# Patient Record
Sex: MALE | Race: WHITE | NOT HISPANIC OR LATINO | Employment: FULL TIME | URBAN - METROPOLITAN AREA
[De-identification: names, ages, dates, MRNs, and addresses within clinical notes are randomized per-mention and may not be internally consistent; named-entity substitution may affect disease eponyms.]

---

## 2020-11-03 ENCOUNTER — HISTORICAL (OUTPATIENT)
Dept: ADMINISTRATIVE | Facility: HOSPITAL | Age: 30
End: 2020-11-03

## 2022-04-10 ENCOUNTER — HISTORICAL (OUTPATIENT)
Dept: ADMINISTRATIVE | Facility: HOSPITAL | Age: 32
End: 2022-04-10

## 2022-04-28 VITALS
BODY MASS INDEX: 33.68 KG/M2 | OXYGEN SATURATION: 97 % | SYSTOLIC BLOOD PRESSURE: 122 MMHG | WEIGHT: 248.69 LBS | HEIGHT: 72 IN | DIASTOLIC BLOOD PRESSURE: 70 MMHG

## 2023-11-14 ENCOUNTER — OFFICE VISIT (OUTPATIENT)
Dept: URGENT CARE | Facility: CLINIC | Age: 33
End: 2023-11-14
Payer: COMMERCIAL

## 2023-11-14 VITALS
TEMPERATURE: 100 F | RESPIRATION RATE: 18 BRPM | OXYGEN SATURATION: 97 % | WEIGHT: 260 LBS | DIASTOLIC BLOOD PRESSURE: 70 MMHG | HEART RATE: 88 BPM | HEIGHT: 72 IN | SYSTOLIC BLOOD PRESSURE: 128 MMHG | BODY MASS INDEX: 35.21 KG/M2

## 2023-11-14 DIAGNOSIS — R05.9 COUGH, UNSPECIFIED TYPE: Primary | ICD-10-CM

## 2023-11-14 DIAGNOSIS — R68.89 FLU-LIKE SYMPTOMS: ICD-10-CM

## 2023-11-14 LAB
CTP QC/QA: YES
CTP QC/QA: YES
MOLECULAR STREP A: NEGATIVE
POC MOLECULAR INFLUENZA A AGN: NEGATIVE
POC MOLECULAR INFLUENZA B AGN: NEGATIVE

## 2023-11-14 PROCEDURE — 87502 POCT INFLUENZA A/B MOLECULAR: ICD-10-PCS | Mod: QW,,,

## 2023-11-14 PROCEDURE — 99213 PR OFFICE/OUTPT VISIT, EST, LEVL III, 20-29 MIN: ICD-10-PCS | Mod: ,,,

## 2023-11-14 PROCEDURE — 99213 OFFICE O/P EST LOW 20 MIN: CPT | Mod: ,,,

## 2023-11-14 PROCEDURE — 87651 POCT STREP A MOLECULAR: ICD-10-PCS | Mod: QW,,,

## 2023-11-14 PROCEDURE — 87651 STREP A DNA AMP PROBE: CPT | Mod: QW,,,

## 2023-11-14 PROCEDURE — 87502 INFLUENZA DNA AMP PROBE: CPT | Mod: QW,,,

## 2023-11-14 RX ORDER — BALOXAVIR MARBOXIL 80 MG/1
80 TABLET, FILM COATED ORAL ONCE
Qty: 1 TABLET | Refills: 0 | Status: SHIPPED | OUTPATIENT
Start: 2023-11-14 | End: 2023-11-14

## 2023-11-14 RX ORDER — BALOXAVIR MARBOXIL 80 MG/1
80 TABLET, FILM COATED ORAL ONCE
Qty: 1 TABLET | Refills: 0 | Status: SHIPPED | OUTPATIENT
Start: 2023-11-14 | End: 2023-11-14 | Stop reason: SDUPTHER

## 2023-11-14 NOTE — PATIENT INSTRUCTIONS
Prescriptions called in to pharmacy   Start the xofluza today   Increase fluid intake. Monitor for fever. Take tylenol/acetaminophen or advil/ibuprofen as needed for headache, bodyaches or fever.   Treat your symptoms like the common cold, take Delysm/dimetapp/robitussin as needed for cough, claritin, flonase, mucinex for congestion, for example.   Complications for flu include pneumonia, bronchitis, and sinusitis.   Stay home for 5 to 7 days total starting from when your symptoms began.  If your symptoms worsen, or you develop shortness of breath, worsening of cough, or fever over 102.5, seek medical attention immediately.

## 2023-11-14 NOTE — PROGRESS NOTES
Subjective:      Patient ID: Emanuel Thrasher is a 33 y.o. male.    Vitals:  height is 6' (1.829 m) and weight is 117.9 kg (260 lb). His oral temperature is 99.8 °F (37.7 °C). His blood pressure is 128/70 and his pulse is 88. His respiration is 18 and oxygen saturation is 97%.     Chief Complaint: Cough     Patient is a 33 y.o. male who presents to urgent care with complaints of cough, low grade fever, mild congestion, mild body aches and sore throat x3rd days. Exposure to flu. Alleviating factors include theraflu with mild amount of relief. Patient denies N/V/D, headache,  sob, cp, n/v/d, abdominal complaints, rash, difficulty swallowing, neck stiffness, or changes in intake or output.           Constitution: Positive for fatigue and fever.   HENT:  Positive for congestion, postnasal drip and sore throat. Negative for trouble swallowing.    Eyes: Negative.    Respiratory:  Positive for cough. Negative for sputum production, wheezing and asthma.    Gastrointestinal: Negative.    Skin:  Negative for erythema.   Allergic/Immunologic: Negative for asthma.      Objective:     Physical Exam   Constitutional: He is oriented to person, place, and time. He appears well-developed. He is cooperative.  Non-toxic appearance. He does not appear ill. No distress.   HENT:   Head: Normocephalic and atraumatic.   Ears:   Right Ear: Hearing, tympanic membrane and external ear normal.   Left Ear: Hearing, tympanic membrane and external ear normal.   Nose: Congestion present.   Mouth/Throat: Mucous membranes are normal. Mucous membranes are moist. Posterior oropharyngeal erythema (clear pnd) present. Oropharynx is clear.   Eyes: Conjunctivae and lids are normal. Extraocular movement intact   Neck: Trachea normal and phonation normal. Neck supple. No edema present. No erythema present. No neck rigidity present.   Cardiovascular: Normal rate, regular rhythm and normal heart sounds.   Pulmonary/Chest: Effort normal and breath sounds normal.  No stridor. No respiratory distress. He has no decreased breath sounds. He has no wheezes. He has no rhonchi. He has no rales.   Abdominal: Normal appearance.   Neurological: He is alert and oriented to person, place, and time. He exhibits normal muscle tone.   Skin: Skin is warm, dry, intact and not diaphoretic. Capillary refill takes less than 2 seconds. No erythema   Psychiatric: His speech is normal and behavior is normal. Mood normal.   Nursing note and vitals reviewed.         Previous History      Review of patient's allergies indicates:  No Known Allergies    History reviewed. No pertinent past medical history.  Current Outpatient Medications   Medication Instructions    XOFLUZA 80 mg, Oral, Once     Past Surgical History:   Procedure Laterality Date    WISDOM TOOTH EXTRACTION       Family History   Problem Relation Age of Onset    Breast cancer Mother     Heart disease Father        Social History     Tobacco Use    Smoking status: Never     Passive exposure: Never    Smokeless tobacco: Never   Substance Use Topics    Alcohol use: Yes    Drug use: Never        Physical Exam      Vital Signs Reviewed   /70   Pulse 88   Temp 99.8 °F (37.7 °C) (Oral)   Resp 18   Ht 6' (1.829 m)   Wt 117.9 kg (260 lb)   SpO2 97%   BMI 35.26 kg/m²        Procedures    Procedures     Labs     Results for orders placed or performed in visit on 11/14/23   POCT Influenza A/B Molecular   Result Value Ref Range    POC Molecular Influenza A Ag Negative Negative, Not Reported    POC Molecular Influenza B Ag Negative Negative, Not Reported     Acceptable Yes    POCT Strep A, Molecular   Result Value Ref Range    Molecular Strep A, POC Negative Negative     Acceptable Yes        Assessment:     1. Cough, unspecified type    2. Flu-like symptoms        Plan:       Cough, unspecified type  -     POCT Influenza A/B Molecular  -     POCT Strep A, Molecular    Flu-like symptoms    Other orders  -      baloxavir marboxiL (XOFLUZA) 80 mg tablet; Take 1 tablet (80 mg total) by mouth once. for 1 dose  Dispense: 1 tablet; Refill: 0      Patients entire family has flu A currently, we will treat for the flu despite the negative test    Prescriptions called in to pharmacy   Increase fluid intake. Monitor for fever. Take tylenol/acetaminophen or advil/ibuprofen as needed for headache, bodyaches or fever.   Treat your symptoms like the common cold, take Delysm/dimetapp/robitussin as needed for cough, claritin, flonase, mucinex for congestion, for example.   Complications for flu include pneumonia, bronchitis, and sinusitis.   Stay home for 5 to 7 days total starting from when your symptoms began.  If your symptoms worsen, or you develop shortness of breath, worsening of cough, or fever over 102.5, seek medical attention immediately.